# Patient Record
Sex: FEMALE | Race: WHITE | Employment: FULL TIME | URBAN - METROPOLITAN AREA
[De-identification: names, ages, dates, MRNs, and addresses within clinical notes are randomized per-mention and may not be internally consistent; named-entity substitution may affect disease eponyms.]

---

## 2017-01-30 ENCOUNTER — TELEPHONE (OUTPATIENT)
Dept: INTERNAL MEDICINE CLINIC | Age: 22
End: 2017-01-30

## 2017-02-02 ENCOUNTER — TELEPHONE (OUTPATIENT)
Dept: INTERNAL MEDICINE CLINIC | Age: 22
End: 2017-02-02

## 2017-05-18 ENCOUNTER — OFFICE VISIT (OUTPATIENT)
Dept: ORTHOPEDIC SURGERY | Age: 22
End: 2017-05-18

## 2017-05-18 VITALS
RESPIRATION RATE: 16 BRPM | HEART RATE: 83 BPM | BODY MASS INDEX: 28.35 KG/M2 | DIASTOLIC BLOOD PRESSURE: 79 MMHG | SYSTOLIC BLOOD PRESSURE: 123 MMHG | WEIGHT: 160 LBS | HEIGHT: 63 IN

## 2017-05-18 DIAGNOSIS — M25.562 ACUTE PAIN OF BOTH KNEES: Primary | ICD-10-CM

## 2017-05-18 DIAGNOSIS — M25.561 ACUTE PAIN OF BOTH KNEES: Primary | ICD-10-CM

## 2017-05-18 DIAGNOSIS — M22.2X2 PATELLOFEMORAL STRESS SYNDROME OF BOTH KNEES: ICD-10-CM

## 2017-05-18 DIAGNOSIS — M22.2X1 PATELLOFEMORAL STRESS SYNDROME OF BOTH KNEES: ICD-10-CM

## 2017-05-18 PROCEDURE — 73562 X-RAY EXAM OF KNEE 3: CPT | Performed by: ORTHOPAEDIC SURGERY

## 2017-05-18 PROCEDURE — 99243 OFF/OP CNSLTJ NEW/EST LOW 30: CPT | Performed by: ORTHOPAEDIC SURGERY

## 2017-05-19 RX ORDER — NAPROXEN 500 MG/1
500 TABLET ORAL 2 TIMES DAILY WITH MEALS
Qty: 60 TABLET | Refills: 0 | Status: SHIPPED | OUTPATIENT
Start: 2017-05-19 | End: 2017-06-08

## 2017-06-08 ENCOUNTER — OFFICE VISIT (OUTPATIENT)
Dept: INTERNAL MEDICINE CLINIC | Age: 22
End: 2017-06-08

## 2017-06-08 DIAGNOSIS — R35.0 URINARY FREQUENCY: Primary | ICD-10-CM

## 2017-06-08 DIAGNOSIS — R30.0 DYSURIA: ICD-10-CM

## 2017-06-08 LAB
BILIRUBIN, POC: NORMAL
BLOOD URINE, POC: NORMAL
CLARITY, POC: NORMAL
COLOR, POC: YELLOW
GLUCOSE URINE, POC: NORMAL
KETONES, POC: NORMAL
LEUKOCYTE EST, POC: NORMAL
NITRITE, POC: NORMAL
PH, POC: 6
PROTEIN, POC: NORMAL
SPECIFIC GRAVITY, POC: 1010
UROBILINOGEN, POC: 0.2

## 2017-06-08 PROCEDURE — 81002 URINALYSIS NONAUTO W/O SCOPE: CPT | Performed by: NURSE PRACTITIONER

## 2017-06-08 PROCEDURE — 99213 OFFICE O/P EST LOW 20 MIN: CPT | Performed by: NURSE PRACTITIONER

## 2017-06-08 RX ORDER — NITROFURANTOIN 25; 75 MG/1; MG/1
100 CAPSULE ORAL 2 TIMES DAILY
Qty: 10 CAPSULE | Refills: 0 | Status: SHIPPED | OUTPATIENT
Start: 2017-06-08 | End: 2017-06-13

## 2017-06-08 RX ORDER — PHENAZOPYRIDINE HYDROCHLORIDE 200 MG/1
200 TABLET, FILM COATED ORAL 3 TIMES DAILY PRN
Qty: 6 TABLET | Refills: 0 | Status: SHIPPED | OUTPATIENT
Start: 2017-06-08 | End: 2017-06-10

## 2017-06-10 LAB
ORGANISM: ABNORMAL
URINE CULTURE, ROUTINE: ABNORMAL

## 2017-06-12 RX ORDER — AMPICILLIN 500 MG/1
500 CAPSULE ORAL 4 TIMES DAILY
Qty: 28 CAPSULE | Refills: 0 | Status: SHIPPED | OUTPATIENT
Start: 2017-06-12 | End: 2017-06-19

## 2017-09-25 ENCOUNTER — TELEPHONE (OUTPATIENT)
Dept: INTERNAL MEDICINE CLINIC | Age: 22
End: 2017-09-25

## 2017-09-25 DIAGNOSIS — Z82.61 FAMILY HISTORY OF RHEUMATOID ARTHRITIS: Primary | ICD-10-CM

## 2017-09-25 DIAGNOSIS — Z83.2 FAMILY HISTORY OF SARCOIDOSIS: ICD-10-CM

## 2017-11-13 ENCOUNTER — TELEPHONE (OUTPATIENT)
Dept: INTERNAL MEDICINE CLINIC | Age: 22
End: 2017-11-13

## 2017-11-13 NOTE — TELEPHONE ENCOUNTER
Referral from dr Jose Alberto Hillman mother your pt you told her ok----spoke to pt Mother 11/13 silvia her for 12/19 ---possible RA or sarcoidosis---records in epic.

## 2017-11-13 NOTE — TELEPHONE ENCOUNTER
Yousuf Zhu is calling about referral for Espiridion Oats, would like Deanne Vidales to call her back. Yousuf Zhu is also needing to talk about her own Rx of levothyroxine.

## 2017-12-19 ENCOUNTER — OFFICE VISIT (OUTPATIENT)
Dept: RHEUMATOLOGY | Age: 22
End: 2017-12-19

## 2017-12-19 VITALS
DIASTOLIC BLOOD PRESSURE: 80 MMHG | WEIGHT: 174 LBS | HEART RATE: 80 BPM | SYSTOLIC BLOOD PRESSURE: 112 MMHG | BODY MASS INDEX: 30.83 KG/M2 | HEIGHT: 63 IN

## 2017-12-19 DIAGNOSIS — M25.542 ARTHRALGIA OF BOTH HANDS: Primary | ICD-10-CM

## 2017-12-19 DIAGNOSIS — M25.541 ARTHRALGIA OF BOTH HANDS: Primary | ICD-10-CM

## 2017-12-19 DIAGNOSIS — M41.117 JUVENILE IDIOPATHIC SCOLIOSIS OF LUMBOSACRAL REGION: ICD-10-CM

## 2017-12-19 LAB
ALBUMIN SERPL-MCNC: 4.5 G/DL (ref 3.4–5)
ALP BLD-CCNC: 51 U/L (ref 40–129)
ALT SERPL-CCNC: 17 U/L (ref 10–40)
AST SERPL-CCNC: 19 U/L (ref 15–37)
BASOPHILS ABSOLUTE: 0 K/UL (ref 0–0.2)
BASOPHILS RELATIVE PERCENT: 0.4 %
BILIRUB SERPL-MCNC: 0.4 MG/DL (ref 0–1)
BILIRUBIN DIRECT: <0.2 MG/DL (ref 0–0.3)
BILIRUBIN, INDIRECT: NORMAL MG/DL (ref 0–1)
C-REACTIVE PROTEIN: 2.2 MG/L (ref 0–5.1)
CREAT SERPL-MCNC: 1 MG/DL (ref 0.6–1.1)
EOSINOPHILS ABSOLUTE: 0.2 K/UL (ref 0–0.6)
EOSINOPHILS RELATIVE PERCENT: 2 %
GFR AFRICAN AMERICAN: >60
GFR NON-AFRICAN AMERICAN: >60
HCT VFR BLD CALC: 40.1 % (ref 36–48)
HEMOGLOBIN: 13.2 G/DL (ref 12–16)
LYMPHOCYTES ABSOLUTE: 2.2 K/UL (ref 1–5.1)
LYMPHOCYTES RELATIVE PERCENT: 26.7 %
MCH RBC QN AUTO: 28.5 PG (ref 26–34)
MCHC RBC AUTO-ENTMCNC: 32.9 G/DL (ref 31–36)
MCV RBC AUTO: 86.6 FL (ref 80–100)
MONOCYTES ABSOLUTE: 0.7 K/UL (ref 0–1.3)
MONOCYTES RELATIVE PERCENT: 8.3 %
NEUTROPHILS ABSOLUTE: 5.1 K/UL (ref 1.7–7.7)
NEUTROPHILS RELATIVE PERCENT: 62.6 %
PDW BLD-RTO: 13.5 % (ref 12.4–15.4)
PLATELET # BLD: 245 K/UL (ref 135–450)
PMV BLD AUTO: 9.2 FL (ref 5–10.5)
RBC # BLD: 4.63 M/UL (ref 4–5.2)
RHEUMATOID FACTOR: <10 IU/ML
TOTAL PROTEIN: 7 G/DL (ref 6.4–8.2)
WBC # BLD: 8.2 K/UL (ref 4–11)

## 2017-12-19 PROCEDURE — 99243 OFF/OP CNSLTJ NEW/EST LOW 30: CPT | Performed by: INTERNAL MEDICINE

## 2017-12-19 NOTE — Clinical Note
[facility] [address] [address] Phone: [phone] Fax: [phone]      December 22, 2017   Hector Dubon DO [address] [place] [id number]   Patient: Rafaela Dixon MR Number: [id number] YOB: 1995 Date of Visit: 12/19/2017   Dear Dr. Hector Dubon:  Thank you for referring Ms. Elvira Hagen to me for evaluation. Enclosed are the relevant portions of my evaluation, assessment, and plan of care. If you have questions, please do not hesitate to call me. I look forward to following Ms. Elvira Hagen along with you.   Sincerely, Mariola Valles MD

## 2017-12-20 LAB
ANA INTERPRETATION: ABNORMAL
ANA TITER: ABNORMAL
ANTI-NUCLEAR ANTIBODY (ANA): POSITIVE

## 2017-12-21 LAB
ANGIOTENSIN CONVERTING ENZYME: 42 U/L (ref 9–67)
CCP IGG ANTIBODIES: 4 UNITS (ref 0–19)

## 2017-12-22 ENCOUNTER — HOSPITAL ENCOUNTER (OUTPATIENT)
Dept: OTHER | Age: 22
Discharge: HOME | End: 2017-12-22
Attending: INTERNAL MEDICINE | Admitting: INTERNAL MEDICINE

## 2017-12-22 DIAGNOSIS — M25.541 ARTHRALGIA OF BOTH HANDS: ICD-10-CM

## 2017-12-22 DIAGNOSIS — M25.542 ARTHRALGIA OF BOTH HANDS: ICD-10-CM

## 2017-12-22 NOTE — PROGRESS NOTES
[facility]) Physicians  [facility]  Rheumatology Progress Note  Brandon Fry MD            [x] [place] [facility]         []  [facility]                                      [address]      [phone]                [phone]      Fax: [phone]                 Fax: [phone]           ______________________________________________________________________      Reason for consultation: scoliosis and evaluation for underlying possibility of inflammatory autoimmune disease    Patient Name:  Amber Mcrae is a 23 y.o. patient     HISTORY OF PRESENT ILLNESS:             Amber Mcrae is a 23 y.o. female is referred by Darrel Dixon for evaluation of having a known chronic childhood history of scoliosis which is now worsening and addition to having a strong family history of inflammatory arthritis now with signs and symptoms which have been chronic and ongoing. Karla Gaines was diagnosed with scoliosis when she was 13years old. She was seen and evaluated by multiple surgeons including surgeons at [facility] and [facility] clinic. Surgery which was recommended was not done at the time. However she recalls that she also started to develop \"dull toothache in her joints\". Those joints included her hands, hips, toes. Her hips and knees are currently very painful. She describes that her knees feel like there is \"sandpaper\" in them and that she hears a crackling noise when she walks. She has morning joint stiffness mainly in her back that lasts about 5-10 minutes. She currently denies having any swelling, warmth, limitation of motion of any of her upper or lower peripheral joints.   She has not had to miss work secondary to her symptoms however she notes that she feels much worse while at work as interstitial lung disease. No history of pleurisy. No history of tuberculosis or atypical infections. Gastrointestinal: No history of dysphagia or esophageal dysmotility. No change in bowel habits or any inflammatory bowel disease. Genitourinary:  No history renal disease, miscarriages. Hematologic/Lymphatic: No abnormal bruising or bleeding, blood clots or swollen lymph nodes. No history of blood transfusions or tattoos. Musculoskeletal:  Refer to HPI   Neurological:  No history seizure or focal weakness. No history of neuropathies, paresthesias or hyperesthesias. No change in gait, balance, memory. Psychiatric:    Anxiety and depression  Endocrine:  Denies any thyroid / parathyroid disease   Allergic/Immunologic: No nasal congestion or hives. Objective:     PHYSICAL EXAM:      Vitals:    /80   Pulse 80   Ht 5' 3\" (1.6 m)   Wt 174 lb (78.9 kg)   BMI 30.82 kg/m²     General appearance: alert, appears stated age and cooperative. Normal gait and posture. No evidence of overt muscular wasting. Evidence of mild right scoliosis of the lower thoracic upper lumbar spine noted. MKS:    There is no evidence of any synovitis, swelling, warmth, tenderness or limitation of motion of any of the patient's upper or lower peripheral joints. audible crepitus of bilateral knees and ankles.    -Spine:  Full range of motion of the axial spine with no paraspinal muscle tenderness or vertebral tenderness. There are  no tender fibromyalgia points. Strength is  5/5  in both upper and lower extremities. Skin:  No evidence of abnormal rash. Palpation of the skin and subcutaneous tissues reveals   evidence of no induration,  subcutaneous nodules or  tenderness. Eyes: Normal conjunctiva and lids normal. Irises and Pupils normal.  HEENT: External inspection of the ears and nose within normal limits. No oral or nasal ulcers. Lips and gums show no evidence of abnormality.    Examination of the oral cavity, oropharynx including the salivary glands reveals no evidence of abnormality. Good salivary pool. Neck: No masses or thyroid enlargement. Lungs: normal respiration effort, clear to auscultation bilaterally. Heart:  regular rate ; No edema in the lower extremities. Abdomen: soft, non-tender; bowel sounds normal  Neurologic: Normal muscle strength in upper and lower extremities, proximally and distally. Deep tendon reflexes preserved, and touch sensation is grossly preserved. Psychiatric: Normal judgment and insight. Orientated to time, place, and person. Lymphatics: Palpation of the lymph nodes in the neck  and supraclavicular area reveals no abnormal masses or adenopathy.        DATA:     Outside data reviewed and in HPI    Lab Results   Component Value Date    WBC 8.2 12/19/2017    HGB 13.2 12/19/2017    HCT 40.1 12/19/2017    MCV 86.6 12/19/2017     12/19/2017     Lab Results   Component Value Date    WBC 8.2 12/19/2017    RBC 4.63 12/19/2017    HGB 13.2 12/19/2017    HCT 40.1 12/19/2017     12/19/2017    MCV 86.6 12/19/2017    MCH 28.5 12/19/2017    MCHC 32.9 12/19/2017    RDW 13.5 12/19/2017    LYMPHOPCT 26.7 12/19/2017    MONOPCT 8.3 12/19/2017    BASOPCT 0.4 12/19/2017    MONOSABS 0.7 12/19/2017    LYMPHSABS 2.2 12/19/2017    EOSABS 0.2 12/19/2017    BASOSABS 0.0 12/19/2017       Chemistry        Component Value Date/Time    CREATININE 1.0 12/19/2017 1650        Component Value Date/Time    ALKPHOS 51 12/19/2017 1650    AST 19 12/19/2017 1650    ALT 17 12/19/2017 1650    BILITOT 0.4 12/19/2017 1650          Lab Results   Component Value Date    SEDRATE 10 08/08/2016     Lab Results   Component Value Date    VIKTORIA POSITIVE 12/19/2017     @BREIFLAB(RF:3,CCP:3)@  @BREIFLAB(CRP:3,SED:3)@    Lab Results   Component Value Date    VIKTORIA POSITIVE 12/19/2017    ANATITER 1:40 (Neg 1:80) 12/19/2017    ANAINT see below 12/19/2017     No results found for: DSDNAG, DSDNAIGGIFA  No results found for: SSAROAB, SSALAAB  No results found for: SMAB, RNPAB  No results found for: CENTABIGG  Lab Results   Component Value Date    SEDRATE 10 08/08/2016    RF <10.0 12/19/2017    CCPABIGG 4 12/19/2017     Lab Results   Component Value Date    ACE 42 12/19/2017     No results found for: Jazlyndave Vance, [facility]    Radiology:    X-ray that patient brought with her was reviewed and scanned into the computer. Evidence of significant thoracolumbar scoliosis. ASSESSMENT AND PLAN:     Assessment/Plan:      Assessment/Plan:  Joanna Rodrigues was seen today for new patient. Diagnoses and all orders for this visit:    Arthralgia of both hands- no evidence currently of inflammatory arthritis noted on physical examination however strong family history of sarcoid and rheumatoid and now with signs and symptoms and the history that may indicate early onset of an inflammatory autoimmune disease. Further evaluation is indicated. Voltaren gel when necessary pain. Risks, side effects and warning signs were fully discussed with patient. Reading information was given to patient to review. If symptoms continue to be ongoing may need to consider Plaquenil, Cymbalta, or Neurontin. To discuss at the next office visit. letter was written on behalf of the patient for accommodations at work to help her with her joint stiffness and pain. -     CBC Auto Differential  -     C-Reactive Protein  -     Creatinine, Serum  -     Hepatic Function Panel  -     VIKTORIA  -     Rheumatoid Factor  -     Cyclic Citrul Peptide Antibody, IgG  -     Angiotensin Converting Enzyme  -     diclofenac sodium (VOLTAREN) 1 % GEL; Apply 2 g topically 4 times daily as needed for Pain  -     XR Hand Bilateral PA and Lateral; Future    Juvenile idiopathic scoliosis of lumbosacral region - chronic history of this [facility] now with worsening symptoms. further evaluation is needed.   -     External Referral To Orthopedic Surgery       Return

## 2017-12-26 NOTE — ADDENDUM NOTE
Encounter addended by: Christina Samuel LPN on: 46/09/5047  6:18 PM<BR>    Actions taken: Letter status changed

## 2018-01-10 ENCOUNTER — TELEPHONE (OUTPATIENT)
Dept: INTERNAL MEDICINE CLINIC | Age: 23
End: 2018-01-10

## 2018-01-10 ENCOUNTER — OFFICE VISIT (OUTPATIENT)
Dept: INTERNAL MEDICINE CLINIC | Age: 23
End: 2018-01-10

## 2018-01-10 VITALS
BODY MASS INDEX: 30.2 KG/M2 | HEART RATE: 103 BPM | OXYGEN SATURATION: 98 % | DIASTOLIC BLOOD PRESSURE: 78 MMHG | SYSTOLIC BLOOD PRESSURE: 119 MMHG | TEMPERATURE: 98.4 F | WEIGHT: 170.5 LBS

## 2018-01-10 DIAGNOSIS — R09.81 SINUS CONGESTION: ICD-10-CM

## 2018-01-10 DIAGNOSIS — R05.9 COUGH: ICD-10-CM

## 2018-01-10 DIAGNOSIS — J06.9 VIRAL UPPER RESPIRATORY TRACT INFECTION: Primary | ICD-10-CM

## 2018-01-10 PROCEDURE — 99213 OFFICE O/P EST LOW 20 MIN: CPT | Performed by: INTERNAL MEDICINE

## 2018-01-10 NOTE — PATIENT INSTRUCTIONS
Patient Education        Viral Respiratory Infection: Care Instructions  Your Care Instructions    Viruses are very small organisms. They grow in number after they enter your body. There are many types that cause different illnesses, such as colds and the mumps. The symptoms of a viral respiratory infection often start quickly. They include a fever, sore throat, and runny nose. You may also just not feel well. Or you may not want to eat much. Most viral respiratory infections are not serious. They usually get better with time and self-care. Antibiotics are not used to treat a viral infection. That's because antibiotics will not help cure a viral illness. In some cases, antiviral medicine can help your body fight a serious viral infection. Follow-up care is a key part of your treatment and safety. Be sure to make and go to all appointments, and call your doctor if you are having problems. It's also a good idea to know your test results and keep a list of the medicines you take. How can you care for yourself at home? · Rest as much as possible until you feel better. · Be safe with medicines. Take your medicine exactly as prescribed. Call your doctor if you think you are having a problem with your medicine. You will get more details on the specific medicine your doctor prescribes. · Take an over-the-counter pain medicine, such as acetaminophen (Tylenol), ibuprofen (Advil, Motrin), or naproxen (Aleve), as needed for pain and fever. Read and follow all instructions on the label. Do not give aspirin to anyone younger than 20. It has been linked to Reye syndrome, a serious illness. · Drink plenty of fluids, enough so that your urine is light yellow or clear like water. Hot fluids, such as tea or soup, may help relieve congestion in your nose and throat. If you have kidney, heart, or liver disease and have to limit fluids, talk with your doctor before you increase the amount of fluids you drink.   · Try to clear

## 2018-07-31 ENCOUNTER — OFFICE VISIT (OUTPATIENT)
Dept: INTERNAL MEDICINE CLINIC | Age: 23
End: 2018-07-31

## 2018-07-31 VITALS
SYSTOLIC BLOOD PRESSURE: 94 MMHG | BODY MASS INDEX: 29.05 KG/M2 | TEMPERATURE: 99.3 F | WEIGHT: 164 LBS | DIASTOLIC BLOOD PRESSURE: 50 MMHG

## 2018-07-31 DIAGNOSIS — R30.0 BURNING WITH URINATION: ICD-10-CM

## 2018-07-31 DIAGNOSIS — N30.90 CYSTITIS: Primary | ICD-10-CM

## 2018-07-31 LAB
BILIRUBIN, POC: NORMAL
BLOOD URINE, POC: NORMAL
CLARITY, POC: CLEAR
COLOR, POC: YELLOW
GLUCOSE URINE, POC: NORMAL
KETONES, POC: NORMAL
LEUKOCYTE EST, POC: NORMAL
NITRITE, POC: NORMAL
PH, POC: 5
PROTEIN, POC: NORMAL
SPECIFIC GRAVITY, POC: 1
UROBILINOGEN, POC: 0.2

## 2018-07-31 PROCEDURE — 81002 URINALYSIS NONAUTO W/O SCOPE: CPT | Performed by: INTERNAL MEDICINE

## 2018-07-31 PROCEDURE — 99213 OFFICE O/P EST LOW 20 MIN: CPT | Performed by: INTERNAL MEDICINE

## 2018-07-31 RX ORDER — BENZONATATE 100 MG/1
100 CAPSULE ORAL 3 TIMES DAILY PRN
COMMUNITY

## 2018-07-31 RX ORDER — SULFAMETHOXAZOLE AND TRIMETHOPRIM 800; 160 MG/1; MG/1
1 TABLET ORAL 2 TIMES DAILY
Qty: 10 TABLET | Refills: 0 | Status: SHIPPED | OUTPATIENT
Start: 2018-07-31 | End: 2018-08-02 | Stop reason: ALTCHOICE

## 2018-07-31 RX ORDER — AZITHROMYCIN 250 MG/1
250 TABLET, FILM COATED ORAL DAILY
COMMUNITY
End: 2019-02-23

## 2018-07-31 NOTE — PATIENT INSTRUCTIONS
For example:  ¨ You have blood or pus in your urine. ¨ You have chills or body aches. ¨ It hurts worse to urinate. ¨ You have groin or belly pain. ¨ You have pain in your back just below your rib cage (the flank area).    Watch closely for changes in your health, and be sure to contact your doctor if you have any problems. Where can you learn more? Go to https://Scylab medicpepiceweb.Polarizonics. org and sign in to your "[employer]" account. Enter B499 in the [employer] box to learn more about \"Painful Urination (Dysuria): Care Instructions. \"     If you do not have an account, please click on the \"Sign Up Now\" link. Current as of: May 12, 2017  Content Version: 11.6  © 9182-8833 [employer]. Care instructions adapted under license by [facility] ([facility]). If you have questions about a medical condition or this instruction, always ask your healthcare professional. [place] [address] any warranty or liability for your use of this information. Patient Education        Urinary Tract Infection in Women: Care Instructions  Your Care Instructions    A urinary tract infection, or UTI, is a general term for an infection anywhere between the kidneys and the urethra (where urine comes out). Most UTIs are bladder infections. They often cause pain or burning when you urinate. UTIs are caused by bacteria and can be cured with antibiotics. Be sure to complete your treatment so that the infection goes away. Follow-up care is a key part of your treatment and safety. Be sure to make and go to all appointments, and call your doctor if you are having problems. It's also a good idea to know your test results and keep a list of the medicines you take. How can you care for yourself at home? · Take your antibiotics as directed. Do not stop taking them just because you feel better. You need to take the full course of antibiotics. · Drink extra water and other fluids for the next day or two.  This may help wash out the bacteria that are causing the infection. (If you have kidney, heart, or liver disease and have to limit fluids, talk with your doctor before you increase your fluid intake.)  · Avoid drinks that are carbonated or have caffeine. They can irritate the bladder. · Urinate often. Try to empty your bladder each time. · To relieve pain, take a hot bath or lay a heating pad set on low over your lower belly or genital area. Never go to sleep with a heating pad in place. To prevent UTIs  · Drink plenty of water each day. This helps you urinate often, which clears bacteria from your system. (If you have kidney, heart, or liver disease and have to limit fluids, talk with your doctor before you increase your fluid intake.)  · Urinate when you need to. · Urinate right after you have sex. · Change sanitary pads often. · Avoid douches, bubble baths, feminine hygiene sprays, and other feminine hygiene products that have deodorants. · After going to the bathroom, wipe from front to back. When should you call for help? Call your doctor now or seek immediate medical care if:    · Symptoms such as fever, chills, nausea, or vomiting get worse or appear for the first time.     · You have new pain in your back just below your rib cage. This is called flank pain.     · There is new blood or pus in your urine.     · You have any problems with your antibiotic medicine.    Watch closely for changes in your health, and be sure to contact your doctor if:    · You are not getting better after taking an antibiotic for 2 days.     · Your symptoms go away but then come back. Where can you learn more? Go to https://Cl[employer]ilarOnTheGo Platforms.Where's [email or web address]. org and sign in to your [employer] account. Enter D472 in the [employer] box to learn more about \"Urinary Tract Infection in Women: Care Instructions. \"     If you do not have an account, please click on the \"Sign Up Now\" link. Current as of:  May 12, 2017  Content

## 2018-08-02 ENCOUNTER — TELEPHONE (OUTPATIENT)
Dept: INTERNAL MEDICINE CLINIC | Age: 23
End: 2018-08-02

## 2018-08-02 LAB
ORGANISM: ABNORMAL
URINE CULTURE, ROUTINE: ABNORMAL
URINE CULTURE, ROUTINE: ABNORMAL

## 2018-08-02 RX ORDER — NITROFURANTOIN 25; 75 MG/1; MG/1
100 CAPSULE ORAL 2 TIMES DAILY
Qty: 14 CAPSULE | Refills: 0 | Status: SHIPPED | OUTPATIENT
Start: 2018-08-02 | End: 2018-08-09

## 2018-08-31 DIAGNOSIS — N39.0 URINARY TRACT INFECTION WITHOUT HEMATURIA, SITE UNSPECIFIED: Primary | ICD-10-CM

## 2019-02-23 ENCOUNTER — TELEPHONE (OUTPATIENT)
Dept: INTERNAL MEDICINE CLINIC | Age: 24
End: 2019-02-23